# Patient Record
Sex: FEMALE | Race: OTHER
[De-identification: names, ages, dates, MRNs, and addresses within clinical notes are randomized per-mention and may not be internally consistent; named-entity substitution may affect disease eponyms.]

---

## 2019-08-28 ENCOUNTER — APPOINTMENT (OUTPATIENT)
Dept: PULMONOLOGY | Facility: CLINIC | Age: 54
End: 2019-08-28
Payer: COMMERCIAL

## 2019-08-28 VITALS
RESPIRATION RATE: 16 BRPM | SYSTOLIC BLOOD PRESSURE: 116 MMHG | BODY MASS INDEX: 36.52 KG/M2 | HEART RATE: 66 BPM | TEMPERATURE: 98.1 F | DIASTOLIC BLOOD PRESSURE: 79 MMHG | WEIGHT: 186 LBS | HEIGHT: 60 IN | OXYGEN SATURATION: 98 %

## 2019-08-28 DIAGNOSIS — E66.3 OVERWEIGHT: ICD-10-CM

## 2019-08-28 DIAGNOSIS — R00.2 PALPITATIONS: ICD-10-CM

## 2019-08-28 DIAGNOSIS — Z83.49 FAMILY HISTORY OF OTHER ENDOCRINE, NUTRITIONAL AND METABOLIC DISEASES: ICD-10-CM

## 2019-08-28 PROCEDURE — 99203 OFFICE O/P NEW LOW 30 MIN: CPT

## 2019-08-28 RX ORDER — CARVEDILOL 3.12 MG/1
3.12 TABLET, FILM COATED ORAL
Refills: 0 | Status: ACTIVE | COMMUNITY
Start: 2019-08-28

## 2019-08-28 NOTE — PHYSICAL EXAM
[General Appearance - Well Developed] : well developed [Normal Appearance] : normal appearance [General Appearance - Well Nourished] : well nourished [Normal Conjunctiva] : the conjunctiva exhibited no abnormalities [Normal Oropharynx] : normal oropharynx [IV] : IV [Neck Appearance] : the appearance of the neck was normal [Neck Cervical Mass (___cm)] : no neck mass was observed [Heart Rate And Rhythm] : heart rate and rhythm were normal [Heart Sounds] : normal S1 and S2 [Murmurs] : no murmurs present [Edema] : no peripheral edema present [] : no respiratory distress [Respiration, Rhythm And Depth] : normal respiratory rhythm and effort [Auscultation Breath Sounds / Voice Sounds] : lungs were clear to auscultation bilaterally [Abnormal Walk] : normal gait [Nail Clubbing] : no clubbing of the fingernails [Cyanosis, Localized] : no localized cyanosis [Skin Turgor] : normal skin turgor [Skin Color & Pigmentation] : normal skin color and pigmentation [No Focal Deficits] : no focal deficits [Oriented To Time, Place, And Person] : oriented to person, place, and time [Impaired Insight] : insight and judgment were intact

## 2019-08-28 NOTE — REVIEW OF SYSTEMS
[Fatigue] : fatigue [Cough] : cough [Headache] : headache [Negative] : Psychiatric [As Noted in HPI] : as noted in HPI [Difficulty Maintaining Sleep] : difficulty maintaining sleep [Snoring] : snoring [Nonrestorative Sleep] : nonrestorative sleep [Awakes With Headache] : awakes with a headache [Awakes With Dry Mouth] : awakes with dry mouth [Fever] : no fever [Chills] : no chills [Difficulty Initiating Sleep] : no difficulty falling asleep [Dysesthesias] : no dysesthesias [Paresthesias] : no paresthesias [Unusual Sleep Behavior] : no unusual sleep behavior [Unusual Movements] : no involuntary movements during sleep [Witnessed Apneas] : demonstrated no ~M apnea [Cataplexy] :  no cataplexy [Hypersomnolence] : not sleeping much more than usual [Sleep Paralysis] : no sleep paralysis [Hypnogogic Hallucinations] : no hypnogogic hallucinations [de-identified] : Buhl Sleepiness Scale 7 (scanned into [employer]) [Hypnopompic Hallucinations] : no hypnopompic hallucinations

## 2019-08-28 NOTE — DISCUSSION/SUMMARY
[FreeTextEntry1] : Pt is a 54F with PMHx PFO who presents to pulmonary clinic for initial evaluation from cardiology office for evluation of possible sleep-disordered breathing. Pt has history of heart palpitations, worse at night, as well as recent weight gain and increased risk for cardiovascular disease, with clinical story consistent with possible sleep apnea. She would benefit from evaluation for sleep-disordered breathing with in-laboratory PSG, especially given co-existing symptomatic heart palpitations. \par -Referred patient for diagnostic PSG\par -Will call pt with results \par
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
## 2019-08-28 NOTE — CONSULT LETTER
[FreeTextEntry2] : Jahaira Watts \par [address]\[place] [address]

## 2019-11-15 ENCOUNTER — APPOINTMENT (OUTPATIENT)
Dept: SLEEP CENTER | Facility: CLINIC | Age: 54
End: 2019-11-15

## 2020-10-27 ENCOUNTER — APPOINTMENT (OUTPATIENT)
Dept: ORTHOPEDIC SURGERY | Facility: CLINIC | Age: 55
End: 2020-10-27

## 2021-02-24 ENCOUNTER — APPOINTMENT (OUTPATIENT)
Dept: ORTHOPEDIC SURGERY | Facility: CLINIC | Age: 56
End: 2021-02-24
Payer: COMMERCIAL

## 2021-02-24 VITALS — BODY MASS INDEX: 33.38 KG/M2 | HEIGHT: 60 IN | WEIGHT: 170 LBS

## 2021-02-24 DIAGNOSIS — Z00.00 ENCOUNTER FOR GENERAL ADULT MEDICAL EXAMINATION W/OUT ABNORMAL FINDINGS: ICD-10-CM

## 2021-02-24 DIAGNOSIS — M17.11 UNILATERAL PRIMARY OSTEOARTHRITIS, RIGHT KNEE: ICD-10-CM

## 2021-02-24 PROCEDURE — 73564 X-RAY EXAM KNEE 4 OR MORE: CPT | Mod: RT

## 2021-02-24 PROCEDURE — 99072 ADDL SUPL MATRL&STAF TM PHE: CPT

## 2021-02-24 PROCEDURE — 99203 OFFICE O/P NEW LOW 30 MIN: CPT

## 2021-02-24 NOTE — DISCUSSION/SUMMARY
[de-identified] : I discussed the treatment of degenerative arthritis with the patient at length today. I described the spectrum of treatment from nonoperative modalities to total joint arthroplasty. Noninvasive and nonoperative treatment modalities include weight reduction, activity modification with low impact exercise, PRN use of acetaminophen or anti-inflammatory medication if tolerated, glucosamine/chondroitin supplements, and physical therapy. Further treatments can include corticosteroid injection and the use of hyaluronic acid injections. Definitive treatment can certainly include total joint arthroplasty also. The risks and benefits of each treatment options was discussed and all questions were answered.

## 2021-02-24 NOTE — PHYSICAL EXAM
[de-identified] : General Exam\par \par Well developed, well nourished\par No apparent distress\par Oriented to person, place, and time\par Mood: Normal\par Affect: Normal\par Balance and coordination: Normal\par Gait: Normal\par \par right knee exam\par \par Skin: Clean, dry, intact\par Inspection: No obvious malalignment, no masses, no swelling, no effusion. old scar anterior knee\par Tenderness: no MJLT. No LJLT. + tenderness over the medial and lateral patella facets and anterior patella. No ttp medial/lateral epicondyle, patella tendon, tibial tubercle, pes anserinus, or proximal fibula.\par ROM: 0 to 130° no pain with deep flexion in both knees\par Stability: Stable to varus, valgus, lachman testing. Negative anterior/posterior drawer.\par Additional tests: Negative McMurrays test, Negative patellar grind test. \par Strength: 5/5 Q/H/TA/GS/EHL, no atrophy\par Neuro: In tact to light touch throughout in dp/sp/tib/dennise/saph nerve districutions, DTR's normal\par Pulses: 2+ DP/PT pulses.\par  [de-identified] : \par The following radiographs were ordered and read by me during this patients visit. I reviewed each radiograph in detail with the patient and discussed the findings as highlighted below. \par \par AP lateral sunrise and oblique view of the right knee were obtained today. There is no apparent fracture or dislocation. There is mild to moderate arthritic changes\par \par Report of MRI from [facility] with subchondral insufficiency fracture of the medial femoral condyle\par \par

## 2021-02-24 NOTE — HISTORY OF PRESENT ILLNESS
[de-identified] : 55-year-old female with a chief complaint of right knee pain. She reports that she sustained a fracture to her knee 4 months ago. She does not know what bone was fractured. She was seen at [facility]. she was told to stay in bed for 3 months to allow the fracture to heal. She reports that she did not do this next went and saw a physical therapy doctor gave her a brace however she did not followup there either. She presents today because of continued pain in her knee she is walking without a brace or assistive device his pain with kneeling and bending her knee she denies numbness and tingling\par \par The patient's past medical history, past surgical history, medications, allergies, and social history were reviewed by me today with the patient and documented accordingly. In addition, the patient's family history, which is noncontributory to this visit, was also reviewed.\par